# Patient Record
(demographics unavailable — no encounter records)

---

## 2024-10-29 NOTE — PHYSICAL EXAM
[General Appearance - Well Developed] : well developed [Normal Station and Gait] : the gait and station were normal for the patient's age [Oriented To Time, Place, And Person] : oriented to person, place, and time [] : no respiratory distress [Abdomen Soft] : soft [de-identified] : left nephrostomy site healed and unremarkable, no erythema.

## 2024-10-29 NOTE — ASSESSMENT
[FreeTextEntry1] : previous left nephrostomy site healed and unremarkable, no erythema.   Ureteroileal stent in place in ileal conduit  Draining yellow urine with mucus threads in Urostomy.   plan 1) right retrograde pyelogram and exchange of right ureteroileal conduit stent in 3-4 months

## 2024-10-29 NOTE — HISTORY OF PRESENT ILLNESS
[Incisional Drainage] : incisional drainage [None] : None [FreeTextEntry1] : 82 yr old male presents to follow up with right ureteroileal stent.   s/p right retrograde pyelogram, exchange of right sided ureteroileal stent on 10/1/24.   HX Left neph tue removed s/p Left renal angiogram and embolization on 06/06/24 by IR Dr. Coburn Post op he was re-hospitalized at [facility] for abd pain and flank pain. Workup revealed ileus.  He is now back to baseline Left nephrostomy tube with minimal output, decreased to almost nothing since angioembolization 80mg of gentamicin instilled into left neph tube, then neph tube removed   [Dysuria] : no dysuria [Hematuria - Gross] : no gross hematuria

## 2025-01-16 NOTE — REVIEW OF SYSTEMS
[As noted in HPI] : as noted in HPI [Limb Swelling] : limb swelling [Negative] : Cardiovascular [FreeTextEntry9] : left arm swelling

## 2025-01-16 NOTE — ASSESSMENT
[FreeTextEntry1] : Mr. Weir has ESRD and gets dialysis via a left brachial artery to transposed basilic vein AV fistula.  He initially had severe left arm swelling as the fistula was also connected to the brachial vein.  He had coils inserted and several angioplasties.  He has recurring stenoses that still require occasional angioplasties.  He again has left arm swelling.  The stenosis was seen in the proximal upper arm segment of the fistula.  The subclavian vein stent did not appear to be narrowed.  It was however not well-seen (this frequently occurs due to shadowing from the clavicle and ribs).  He will get a fistulogram and most likely another angioplasty.  My office will make arrangements for it to be done soon.

## 2025-01-16 NOTE — HISTORY OF PRESENT ILLNESS
[FreeTextEntry1] : He has ESRD and has been getting dialysis for at least6 years.  He has a left brachial artery to basilic vein AV fistula.  He has outflow stenoses that have on several occasions recurred.  At 1 point in time he had a severe left subclavian vein stenosis.  It was eventually stented.  He recently redeveloped left arm swelling.  He notes his arm to be heavy.  There have however not been any issues with using the fistula.

## 2025-01-16 NOTE — PHYSICAL EXAM
[Normal Breath Sounds] : Normal breath sounds [Normal Heart Sounds] : normal heart sounds [2+] : left 2+ [Ankle Swelling (On Exam)] : not present [Abdomen Masses] : No abdominal masses [Abdomen Tenderness] : ~T ~M No abdominal tenderness [No Rash or Lesion] : No rash or lesion [Alert] : alert [Oriented to Person] : oriented to person [Oriented to Place] : oriented to place [Oriented to Time] : oriented to time [Calm] : calm [de-identified] : He was a pleasant gentleman in no distress [FreeTextEntry1] : There was severe left arm swelling. The left upper arm AV fistula was pulsatile and had a diminished thrill.

## 2025-01-30 NOTE — REVIEW OF SYSTEMS
[Fever] : no fever [Chills] : no chills [Eyesight Problems] : eyesight problems [Loss Of Hearing] : hearing loss [Lower Ext Edema] : lower extremity edema [Difficulty Walking] : difficulty walking [Anxiety] : anxiety [Emotional Problems] : emotional problems [see HPI] : see HPI [Muscle Weakness] : muscle weakness [Negative] : Heme/Lymph

## 2025-01-30 NOTE — ASSESSMENT
[FreeTextEntry1] : 82 Y.O MALE walk in with cc of Ureteroileal stent dislodged since past 3-4 days, with purulent discharge in his ileoconduit .Pt reports having purulent yellow blood-tinged output in his ileoconduit bag. No fever, no N/V , HD x 3, on fluids restriction  PMH  PSH - s/p Right Ureteroileal stent IR exchange 10/1/2024, due for next change  Pt appears emotionally distraught, overwhelmed, teary , emotional support rendered , pt not receptive to need for ED intervention.  drove here today with no accompanying family member, disheveled appearance, facial beard, clothes  Fluids - 3 glasses of water and 1 cup of coffee  Dr Gonzalez contacted via phone at 1000 am  and pt advised to go to ED ,refused  We tried to contact family members on file since 1001 am  - Angelika Dtr  1  Danielito Lainez  .Son answered phone at 1025 am and convinced father to go to ED  Ambulance contacted at 1026 am and awaiting transportation - pt was transported to [facility] by EMS around 11am.  Son Migel was to meet his father at [facility] ED

## 2025-01-30 NOTE — PHYSICAL EXAM
[General Appearance - In No Acute Distress] : no acute distress [Abdomen Soft] : soft [Costovertebral Angle Tenderness] : no ~M costovertebral angle tenderness [Diffuse] : diffusely [Epigastric] : in the epigastric area [Periumbilical] : in the periumbilical area [Normal Station and Gait] : the gait and station were normal for the patient's age [] : no rash [Oriented To Time, Place, And Person] : oriented to person, place, and time [de-identified] : anxious,teary

## 2025-01-30 NOTE — HISTORY OF PRESENT ILLNESS
[FreeTextEntry1] : 82 Y.O MALE walk in with cc of Ureteroileal stent dislodged since past 3-4 days, with purulent discharge in his ileoconduit .Pt reports having purulent yellow blood-tinged output in his ileoconduit bag. No fever, no N/V , HD x 3, on fluids restriction Pt appears emotionally distraught, overwhelmed, teary , emotional support rendered , pt not receptive to need for ED intervention.  drove here today with no accompanying family member, disheveled appearance, facial beard. PMH- Bladder cancer, PSH- Cystectomy,Prostatectomy  IMPRESSION: CTAP w/w/o  IV contrast  2/29/2024  Right ureteral stent extends into the right-sided ileal conduit. Left percutaneous nephrostomy tube identified.. The unopacified on delayed urographic appears unremarkable.  No hydronephrosis bilaterally.       Fluids - 3 glasses of water and 1 cup of coffee  Dr Gonzalez contacted via phone at 1000 am  and pt advised to go to ED ,refused  We tried to contact family members on file since 1001 am  - Angelika Dtr  2  Danielito Lainez  .Son answered phone at 1025 am and convinced father to go to ED  Ambulance contacted at 1026 am and awaiting transportation - pt was transported to [facility] by EMS around 11am.  Son Migel was to meet his father at [facility] ED

## 2025-02-10 NOTE — HISTORY OF PRESENT ILLNESS
[FreeTextEntry1] : 82 yr old male presents to follow up with right ureteroileal stent.   January 30,2025- Patient seen by NP David Abbott: reteroileal stent dislodged since past 3-4 days, with purulent discharge in his ileoconduit .Pt reports having purulent yellow blood-tinged output in his ileoconduit bag. No fever, no N/V , HD x 3, on fluids restriction  January 31, 2025- IR placed fluoroscopic guided retrograde nephroureteral exchange/replacement over wire without complications.   HX s/p right retrograde pyelogram, exchange of right sided ureteroileal stent on 10/1/24.

## 2025-02-10 NOTE — ASSESSMENT
[FreeTextEntry1] : Ureteroileal stent in place in ileal conduit  Draining yellow urine with mucus threads in Urostomy.   plan 1) right retrograde pyelogram and exchange of right ureteroileal conduit stent in 3-4 months

## 2025-02-16 NOTE — ASSESSMENT
[FreeTextEntry1] : Mr. Weir has ESRD and gets dialysis via a left brachial artery to transposed basilic vein fistula. He had multiple issues with the fistula with stenotic areas. He had several angioplasties. The last angioplasty was approximately 10 days ago. He did well with that procedure. There were stenoses in the junction between the basilic vein and the deep vein system and also in the previous stented subclavian vein. He has no arm swelling now. There was a good thrill in the fistula and a duplex scan noted no significant stenosis. There was mild narrowing in the subclavian vein stent. He was reassured and will return in 3 months for another scan.

## 2025-02-16 NOTE — HISTORY OF PRESENT ILLNESS
[FreeTextEntry1] : He has ESRD and gets dialysis via a left brachial artery to transposed basilic vein AV fistula. He intermittently gets significant leg swelling. When I first met him he had the fistula connected to both the deep and superficial venous systems. It had massive outflow and was causing massive arm swelling. The deep vein branches were coiled and the arm swelling improved. He also had a stenosis of the subclavian vein. That ultimately was stented. He recently had severe arm swelling and a duplex scan noted recurrent stenoses. He had an angioplasty of the subclavian vein and also the connection between the brachial vein in the deep system. He has noted significant improvement in his arm swelling. There were no recent issues with cannulation of the fistula. This was even when the arm was swollen.

## 2025-02-16 NOTE — PHYSICAL EXAM
[Normal Breath Sounds] : Normal breath sounds [Normal Heart Sounds] : normal heart sounds [2+] : left 2+ [No Rash or Lesion] : No rash or lesion [Alert] : alert [Oriented to Person] : oriented to person [Oriented to Place] : oriented to place [Oriented to Time] : oriented to time [Calm] : calm [Ankle Swelling (On Exam)] : not present [Abdomen Masses] : No abdominal masses [Abdomen Tenderness] : ~T ~M No abdominal tenderness [de-identified] : He was a pleasant gentleman in no distress [FreeTextEntry1] : There was severe left arm swelling. The left upper arm AV fistula was pulsatile and had a diminished thrill.

## 2025-02-16 NOTE — PHYSICAL EXAM
[Normal Breath Sounds] : Normal breath sounds [Normal Heart Sounds] : normal heart sounds [2+] : left 2+ [No Rash or Lesion] : No rash or lesion [Alert] : alert [Oriented to Person] : oriented to person [Oriented to Place] : oriented to place [Oriented to Time] : oriented to time [Calm] : calm [Ankle Swelling (On Exam)] : not present [Abdomen Masses] : No abdominal masses [Abdomen Tenderness] : ~T ~M No abdominal tenderness [de-identified] : He was a pleasant gentleman in no distress [FreeTextEntry1] : There was severe left arm swelling. The left upper arm AV fistula was pulsatile and had a diminished thrill.

## 2025-03-20 NOTE — REASON FOR VISIT
Gabapentin:  Use current home supply and increase to 900mg for 3 nights. Then increase to 1200mg nightly.  New prescription will be sent to pharmacy for increased dosing of 1200 nightly (will take 2 tabs nightly)   [de-identified] : CABG X 3 (LIMA to LAD, SVG to circumflex PL, PDA) [de-identified] : 3/5/25

## 2025-03-20 NOTE — CONSULT LETTER
[Dear  ___] : Dear  [unfilled], [Courtesy Letter:] : I had the pleasure of seeing your patient, [unfilled], in my office today. [Please see my note below.] : Please see my note below. [Consult Closing:] : Thank you very much for allowing me to participate in the care of this patient.  If you have any questions, please do not hesitate to contact me. [Sincerely,] : Sincerely, [FreeTextEntry3] : Basilio Herbert MD  Department of cardiovascular and Thoracic surgery Professor Department of surgery [facility]   [FreeTextEntry2] : Dr. Carine Sutton

## 2025-03-20 NOTE — ASSESSMENT
[FreeTextEntry1] :  81 y/o M PMH bladder cancer s/p bladder resection, ESRD with HD on M/W/F, DVT s/p IVC filter, MI/CAD s/p cardiac stent, HLD, HTN, gout, presenting to ED for L sided exertional CP x 2days and SOB x 1 day. Feels like when he needed his last stent. Took 81mg ASA and Plavix today. Sent in from Dr. Sutton's office.   He is s/p CABG X 3 (LIMA to LAD, SVG to circumflex PL, PDA) on 3/5/25 .POST OP GI ILEUS a" NEOSTIGMINE - + BM RAPID AFIB a" AMIO GTT a" AMIO LOAD/LOP, 3/10 HD 1.8 L removed / NSR AT 0500 / BRACH A LINE D/CD 5 PM  - Eliquis 2.5 mg BID for afib to start tomorrow 12 pm. left hand swelling --> elevated to pillow, per nephrology will need angio of the fistula (swollen hand) when he is on the floor (Dr Coppola).  On 2 L NC spo2 96% -- wean o2 to room air with spo2 goal of 92%. start eliquis in am 3/12; Hd MWF ss per renal; vasc sx consulted-->for Left AVF edema;  O2 weaned off; 3/12  +bm  x 2  pt encouraged to ambulate - rounds made w/ Dr. Herbert. possible d/c home, 3/13 BB increased for paf - pt c/o difficulty swallowing today - speech & swallow consult ordered - will follow up 3/14 RSR/ pafib on eliqius- rate controlled afib; hd this am; discharge pt home as per DR. Herbert

## 2025-03-20 NOTE — CONSULT LETTER
[Dear  ___] : Dear  [unfilled], [Courtesy Letter:] : I had the pleasure of seeing your patient, [unfilled], in my office today. [Please see my note below.] : Please see my note below. [Consult Closing:] : Thank you very much for allowing me to participate in the care of this patient.  If you have any questions, please do not hesitate to contact me. [Sincerely,] : Sincerely, [FreeTextEntry3] : Basilio Herbert MD  Department of cardiovascular and Thoracic surgery Professor Department of surgery [facility]   [FreeTextEntry2] : Dr. Carine Sutton

## 2025-03-27 NOTE — END OF VISIT
[FreeTextEntry3] :  I, Dr. Basilio Herbert, personally performed the evaluation and management (E/M) services for this established  patient. That E/M includes conducting the initial examination, assessing all conditions, and establishing the plan of care. Today, EREN POOLE  was here to observe my evaluation and management services for this patient.

## 2025-03-27 NOTE — PHYSICAL EXAM
[] : no respiratory distress [Respiration, Rhythm And Depth] : normal respiratory rhythm and effort [Auscultation Breath Sounds / Voice Sounds] : lungs were clear to auscultation bilaterally [Apical Impulse] : the apical impulse was normal [Heart Rate And Rhythm] : heart rate was normal and rhythm regular [Heart Sounds] : normal S1 and S2 [Murmurs] : no murmurs [Clean] : clean [Dry] : dry [Healing Well] : healing well [Pitting Edema] : pitting edema present [___ +] : bilateral ankle [unfilled]U+ pitting edema

## 2025-03-27 NOTE — DISCUSSION/SUMMARY
[Coumadin] : the patient is not on Coumadin [Slow Progress] : is progressing slowly [No Sign of Infection] : is showing no signs of infection [5] : 5

## 2025-03-27 NOTE — CONSULT LETTER
[FreeTextEntry2] : Dr. Carine Sutton [FreeTextEntry3] : Basilio Herbert MD  Department of cardiovascular and Thoracic surgery Professor Department of surgery [facility]

## 2025-03-27 NOTE — ASSESSMENT
[FreeTextEntry1] : 83 y/o M PMH bladder cancer s/p bladder resection, ESRD with HD on M/W/F, DVT s/p IVC filter, MI/CAD s/p cardiac stent, HLD, HTN, gout, presenting to ED for L sided exertional CP x 2days and SOB x 1 day. Feels like when he needed his last stent. Took 81mg ASA and Plavix today. Sent in from Dr. Sutton's office.   He is s/p CABG X 3 (LIMA to LAD, SVG to circumflex PL, PDA) on 3/5/25 .POST OP GI ILEUS a" NEOSTIGMINE - + BM RAPID AFIB a" AMIO GTT a" AMIO LOAD/LOP, 3/10 HD 1.8 L removed / NSR AT 0500 / BRACH A LINE D/CD 5 PM  - Eliquis 2.5 mg BID for afib to start tomorrow 12 pm. left hand swelling --> elevated to pillow, per nephrology will need angio of the fistula (swollen hand) when he is on the floor (Dr Coppola).  On 2 L NC spo2 96% -- wean o2 to room air with spo2 goal of 92%. start eliquis in am 3/12; Hd MWF ss per renal; vasc sx consulted-->for Left AVF edema;  O2 weaned off; 3/12  +bm  x 2  pt encouraged to ambulate - rounds made w/ Dr. Herbert. possible d/c home, 3/13 BB increased for paf - pt c/o difficulty swallowing today - speech & swallow consult ordered - will follow up 3/14 RSR/ pafib on eliqius- rate controlled afib; hd this am; discharge pt home as per DR. Herbert  Today he presents and reports that he has pain to his legs with walking. Denies any chest pain, shortness of breath, palpitations, dizziness or PND.     Today on exam bilateral lung fields are clear, no wheezing or rales, no use of accessory muscles noted or respiratory distress, normal sinus rhythm, sternum stable, incision clean, dry and intact. B/L SVG site is clean, dry and intact.  +1 to +2 peripheral edema noted.     Instructed patient on importance of optimal glycemic control, daily showering, daily weights, any signs of fever (temperature greater than 101F, chills,  incentive spirometer use, and increase ambulation as tolerated. Instructed to call office with any signs or symptoms of infection or weight gain of 2 or more pounds in 1 day or 3 or more pounds in 1 week.    Discussed intake of plant based foods, including vegetables, fruits, and whole grain foods: legumes, nuts and seeds, fish or seafood, lean meats, and non-fat or low-fat diary foods. Plant based oils (non-tropical) in place of solid fats. Instructed patient to limit intake of high fat meats and processed meats, high-fat diary foods, dietary cholesterol and sodium, foods and beverages with added sugars.   Plan: 1) Continue current medication regimen 2) Follow up with cardiologist and PCP 3) May return on as needed basis 4)  B/L Venous duplex to r/o DVT  5) SBE antibiotic prophylaxis discussed at length 6) Continue to increase activity and walk daily as tolerated. Continue to use incentive spirometer. 7) Keep legs elevated above heart when resting/sitting/sleeping. 8) Call MD if you experience fever, fatigue, dizziness, confusion, syncope, shortness of breath, chest pain not relieved with analgesics, increased redness/drainage from the surgical  incision site 9) Follow up with  Nephrologist

## 2025-04-01 NOTE — HISTORY OF PRESENT ILLNESS
[FreeTextEntry1] : 81 y/o M PMH bladder cancer s/p bladder resection, ESRD with HD on M/W/F, DVT s/p IVC filter, MI/CAD s/p cardiac stent, HLD, HTN, gout, S/P 3/5 C3L. POST OP GI ILEUS NEOSTIGMINE - + BM RAPID AFIB on AC DC home 3/14. On 3/17 Presents after HD and stated he has shortness of breath, leg edema and feels unstable with ambulation. Evaluated in ED will admit for fluid overload/Pl effusion   Hospital Course: 3/18 Chest CT Leg Dopp Holding AC possible pigtail 3/19 VSS - will obtain U/S LLL to assess pleural effusion- need for tap? Eliquis on hold for now. Change lopressor 50 bid to Toprol 50 once a day per Dr. Herbert 3/20 VSS; rsr 60-70; pafib 3/19; Eliquis on hold for left pigtail placement today; Left pigtail placement--> 1; pigtail clamped for 1 hour; ck f/u chest x-ray; ck rt foot x-rays HD as per renal - MWF 3/21 VSS; Left PTC with minimal drainage; D/C'd. Follow up CXR. Continue with current medication regimen. HD today per Renal. 3/22 VSS - plan for d/c home today  pt was not at his home upon my arrival for his scheduled home visit he had presented to Dr Herbert's office for further eval of leg edema confrimed with NOAH Mccauley in CTS office

## 2025-05-20 NOTE — PHYSICAL EXAM
[Normal Breath Sounds] : Normal breath sounds [Normal Heart Sounds] : normal heart sounds [2+] : left 2+ [0] : left 0 [Ankle Swelling (On Exam)] : present [Ankle Swelling Bilaterally] : bilaterally  [Ankle Swelling On The Right] : mild [Skin Ulcer] : ulcer [Alert] : alert [Oriented to Person] : oriented to person [Oriented to Place] : oriented to place [Oriented to Time] : oriented to time [Calm] : calm [Varicose Veins Of Lower Extremities] : not present [] : not present [Abdomen Masses] : No abdominal masses [Abdomen Tenderness] : ~T ~M No abdominal tenderness [de-identified] : He was a pleasant gentleman in no distress [FreeTextEntry1] : There was minimal left arm swelling. The left upper arm AV fistula had a good thrill. There was a tiny area of eschar on the dorsum of the right foot.  It was at the level of the metatarsal heads.  It appeared to be healing.  There was slight dark skin around it.  There was no erythema/cellulitis or evidence of infection. [de-identified] : There was an essentially healed right foot wound.

## 2025-05-20 NOTE — PHYSICAL EXAM
[Normal Breath Sounds] : Normal breath sounds [Normal Heart Sounds] : normal heart sounds [2+] : left 2+ [0] : left 0 [Ankle Swelling (On Exam)] : present [Ankle Swelling Bilaterally] : bilaterally  [Ankle Swelling On The Right] : mild [Skin Ulcer] : ulcer [Alert] : alert [Oriented to Person] : oriented to person [Oriented to Place] : oriented to place [Oriented to Time] : oriented to time [Calm] : calm [Varicose Veins Of Lower Extremities] : not present [] : not present [Abdomen Masses] : No abdominal masses [Abdomen Tenderness] : ~T ~M No abdominal tenderness [de-identified] : He was a pleasant gentleman in no distress [FreeTextEntry1] : There was minimal left arm swelling. The left upper arm AV fistula had a good thrill. There was a tiny area of eschar on the dorsum of the right foot.  It was at the level of the metatarsal heads.  It appeared to be healing.  There was slight dark skin around it.  There was no erythema/cellulitis or evidence of infection. [de-identified] : There was an essentially healed right foot wound.

## 2025-05-20 NOTE — ASSESSMENT
[FreeTextEntry1] : Mr. Weir recently developed a small wound in the right foot.  The wound was essentially healed.  There was focal dry eschar.  He has no claudication but is only walking a moderate amount. He is still recovering from a CABG.  He had palpable femoral pulses, but the popliteal and pedal pulses were not palpable.  His feet were warm with good capillary refill. A PVR study noted mild PAD.  The RAIN was 0.71 on the right and 0.99 on the left.  He does have some calcified arteries which can cause artificial elevation of the RAIN.  The PVR waveforms in both legs was only slightly decreased and thus consistent with mild PAD.  He does not need any intervention for the healing wound.  He will return in a few months to have the fistula reassessed.  He was overall reassured.

## 2025-05-20 NOTE — PHYSICAL EXAM
[Normal Breath Sounds] : Normal breath sounds [Normal Heart Sounds] : normal heart sounds [2+] : left 2+ [0] : left 0 [Ankle Swelling (On Exam)] : present [Ankle Swelling Bilaterally] : bilaterally  [Ankle Swelling On The Right] : mild [Skin Ulcer] : ulcer [Alert] : alert [Oriented to Person] : oriented to person [Oriented to Place] : oriented to place [Oriented to Time] : oriented to time [Calm] : calm [Varicose Veins Of Lower Extremities] : not present [] : not present [Abdomen Masses] : No abdominal masses [Abdomen Tenderness] : ~T ~M No abdominal tenderness [de-identified] : He was a pleasant gentleman in no distress [FreeTextEntry1] : There was minimal left arm swelling. The left upper arm AV fistula had a good thrill. There was a tiny area of eschar on the dorsum of the right foot.  It was at the level of the metatarsal heads.  It appeared to be healing.  There was slight dark skin around it.  There was no erythema/cellulitis or evidence of infection. [de-identified] : There was an essentially healed right foot wound.

## 2025-05-20 NOTE — HISTORY OF PRESENT ILLNESS
[FreeTextEntry1] : He has been seeing me for several years with issues related to his left arm AV fistula.  He had multiple stenotic issues and at several points had a very swollen arm.  He currently has no issues with the fistula or his arm.  He recently fell and sustained a small wound in his right foot. He has no pain in the leg or foot. He thinks the wound is healing. He has CAD and had a CABG 2 months ago.

## 2025-06-23 NOTE — ASSESSMENT
[FreeTextEntry1] : Mr. Weir has ESRD and gets dialysis via a left brachial artery to basilic vein AV fistula. He had multiple previous episodes of swelling of the fistula. He has no pain or swelling in the arm now. A duplex scan noted the fistula to be patent with no significant stenosis. He was reassured. He will return in 6 months. He may return sooner if the swelling gets worse  He has recovered from his heart surgery and the wounds in his leg that were healing slowly from the saphenous vein harvest have now completely resolved.

## 2025-06-23 NOTE — PHYSICAL EXAM
[Normal Breath Sounds] : Normal breath sounds [Normal Heart Sounds] : normal heart sounds [2+] : left 2+ [0] : left 0 [Ankle Swelling (On Exam)] : present [Ankle Swelling Bilaterally] : bilaterally  [Ankle Swelling On The Right] : mild [Alert] : alert [Oriented to Person] : oriented to person [Oriented to Place] : oriented to place [Oriented to Time] : oriented to time [Calm] : calm [No Rash or Lesion] : No rash or lesion [Varicose Veins Of Lower Extremities] : not present [] : not present [Abdomen Masses] : No abdominal masses [Abdomen Tenderness] : ~T ~M No abdominal tenderness [Skin Ulcer] : no ulcer [de-identified] : He was a pleasant gentleman in no distress [FreeTextEntry1] : There was minimal left arm swelling. The left upper arm AV fistula had a good thrill. [de-identified] : There was an essentially healed right foot wound.

## 2025-06-23 NOTE — PHYSICAL EXAM
[Normal Breath Sounds] : Normal breath sounds [Normal Heart Sounds] : normal heart sounds [2+] : left 2+ [0] : left 0 [Ankle Swelling (On Exam)] : present [Ankle Swelling Bilaterally] : bilaterally  [Ankle Swelling On The Right] : mild [Alert] : alert [Oriented to Person] : oriented to person [Oriented to Place] : oriented to place [Oriented to Time] : oriented to time [Calm] : calm [No Rash or Lesion] : No rash or lesion [Varicose Veins Of Lower Extremities] : not present [] : not present [Abdomen Masses] : No abdominal masses [Abdomen Tenderness] : ~T ~M No abdominal tenderness [Skin Ulcer] : no ulcer [de-identified] : He was a pleasant gentleman in no distress [FreeTextEntry1] : There was minimal left arm swelling. The left upper arm AV fistula had a good thrill. [de-identified] : There was an essentially healed right foot wound.

## 2025-06-23 NOTE — HISTORY OF PRESENT ILLNESS
[FreeTextEntry1] : He has been seeing me for several years with issues related to his left arm AV fistula.  He had multiple stenotic issues and at several points had a very swollen arm.  He currently has no issues with the fistula or his arm.  He recently fell and sustained a small wound in his right foot. He has no pain in the leg or foot. He thinks the wound is healing. He has CAD and had a CABG 2 months ago.    He has ESRD and has a left brachial basilic AV fistula. He had multiple issues with this fistula and at one point had a very swollen arm. He has required angioplasties of the subclavian vein and also angioplasties within the fistula. He had a CABG a few months ago. He said he is now feeling much better. He had leg swelling after the surgery. He thinks that has resolved. There have been no recent issues with the fistula and his arm swelling has not significantly recurred.

## 2025-06-23 NOTE — PHYSICAL EXAM
[Normal Breath Sounds] : Normal breath sounds [Normal Heart Sounds] : normal heart sounds [2+] : left 2+ [0] : left 0 [Ankle Swelling (On Exam)] : present [Ankle Swelling Bilaterally] : bilaterally  [Ankle Swelling On The Right] : mild [Alert] : alert [Oriented to Person] : oriented to person [Oriented to Place] : oriented to place [Oriented to Time] : oriented to time [Calm] : calm [No Rash or Lesion] : No rash or lesion [Varicose Veins Of Lower Extremities] : not present [] : not present [Abdomen Masses] : No abdominal masses [Abdomen Tenderness] : ~T ~M No abdominal tenderness [Skin Ulcer] : no ulcer [de-identified] : He was a pleasant gentleman in no distress [FreeTextEntry1] : There was minimal left arm swelling. The left upper arm AV fistula had a good thrill. [de-identified] : There was an essentially healed right foot wound.